# Patient Record
Sex: MALE | Race: WHITE | NOT HISPANIC OR LATINO | Employment: OTHER | URBAN - METROPOLITAN AREA
[De-identification: names, ages, dates, MRNs, and addresses within clinical notes are randomized per-mention and may not be internally consistent; named-entity substitution may affect disease eponyms.]

---

## 2023-03-22 PROBLEM — E78.2 HYPERLIPIDEMIA, MIXED: Status: ACTIVE | Noted: 2022-12-18

## 2023-03-22 PROBLEM — R07.2 PRECORDIAL CHEST PAIN: Status: ACTIVE | Noted: 2022-12-18

## 2023-03-22 PROBLEM — I25.810 CORONARY ARTERY DISEASE INVOLVING CORONARY BYPASS GRAFT OF NATIVE HEART: Status: ACTIVE | Noted: 2022-12-18

## 2023-03-22 PROBLEM — I10 HYPERTENSION: Status: ACTIVE | Noted: 2022-12-18

## 2023-08-01 PROBLEM — I25.10 CORONARY ARTERY DISEASE INVOLVING NATIVE CORONARY ARTERY OF NATIVE HEART WITHOUT ANGINA PECTORIS: Status: ACTIVE | Noted: 2023-08-01

## 2023-11-10 PROBLEM — I25.810 CORONARY ARTERY DISEASE INVOLVING CORONARY BYPASS GRAFT OF NATIVE HEART WITHOUT ANGINA PECTORIS: Status: ACTIVE | Noted: 2023-08-01

## 2024-04-26 ENCOUNTER — TELEPHONE (OUTPATIENT)
Dept: CARDIOLOGY | Facility: MEDICAL CENTER | Age: 69
End: 2024-04-26

## 2024-04-26 NOTE — TELEPHONE ENCOUNTER
Prior Authorization for Repatha SureClick 140MG/ML auto-injectors has been approved for a quantity of 2 , day supply 28    Prior Authorization reference number: [id number]  Insurance: [employer]  Effective dates: 4/26/24 to 10/24/24  Copay: $47     Is patient eligible to fill with [facility]? Yes    Next Steps:  call pharmacy with PA approval

## 2024-04-26 NOTE — TELEPHONE ENCOUNTER
Prior Authorization for Repatha SureClick 140MG/ML auto-injectors (Quantity: 2, Days: 28) has been submitted via [employer]    Insurance: [employer]    Will follow up in 24-48 business hours.

## 2024-04-26 NOTE — TELEPHONE ENCOUNTER
Received Renewal PA request via [employer]  for Repatha SureClick 140MG/ML auto-injectors  . (Quantity:2, Day Supply:84)     Insurance: [employer] d[employer]b/a [employer]  Member ID:  [id number]  BIN: [id number]  PCN: [id number]  Group: [id number]     Ran Test claim via [place] & medication Rejects stating prior authorization is required.

## 2024-05-23 PROBLEM — I82.431 ACUTE DEEP VEIN THROMBOSIS (DVT) OF POPLITEAL VEIN OF RIGHT LOWER EXTREMITY (HCC): Status: ACTIVE | Noted: 2024-05-23

## 2024-05-23 PROBLEM — Z95.828 HISTORY OF ARTERIAL BYPASS OF LOWER EXTREMITY: Status: ACTIVE | Noted: 2024-05-23

## 2024-05-23 PROBLEM — I72.4 ANEURYSM OF ARTERY OF LOWER EXTREMITY (HCC): Status: ACTIVE | Noted: 2024-05-01

## 2024-09-13 ENCOUNTER — TELEPHONE (OUTPATIENT)
Dept: CARDIOLOGY | Facility: MEDICAL CENTER | Age: 69
End: 2024-09-13

## 2024-09-13 NOTE — TELEPHONE ENCOUNTER
Attempted to contact patient at [phone] to discuss [facility] and services/benefits offered. No answer, left voicemail.    LVM at this time there is no FA for repatha.

## 2025-03-04 PROBLEM — R35.1 BENIGN PROSTATIC HYPERPLASIA WITH NOCTURIA: Status: ACTIVE | Noted: 2025-03-04

## 2025-03-04 PROBLEM — N40.1 BENIGN PROSTATIC HYPERPLASIA WITH NOCTURIA: Status: ACTIVE | Noted: 2025-03-04

## 2025-07-02 DIAGNOSIS — I25.10 CORONARY ARTERY DISEASE INVOLVING NATIVE CORONARY ARTERY OF NATIVE HEART WITHOUT ANGINA PECTORIS: ICD-10-CM

## 2025-07-02 DIAGNOSIS — I10 ESSENTIAL HYPERTENSION, BENIGN: ICD-10-CM

## 2025-07-02 RX ORDER — LOSARTAN POTASSIUM 100 MG/1
100 TABLET ORAL DAILY
Qty: 90 TABLET | Refills: 0 | Status: SHIPPED | OUTPATIENT
Start: 2025-07-02